# Patient Record
Sex: FEMALE | Race: WHITE | NOT HISPANIC OR LATINO | Employment: FULL TIME | URBAN - METROPOLITAN AREA
[De-identification: names, ages, dates, MRNs, and addresses within clinical notes are randomized per-mention and may not be internally consistent; named-entity substitution may affect disease eponyms.]

---

## 2018-05-22 ENCOUNTER — RECORDS - HEALTHEAST (OUTPATIENT)
Dept: LAB | Facility: CLINIC | Age: 43
End: 2018-05-22

## 2018-05-22 LAB
ALBUMIN SERPL-MCNC: 4 G/DL (ref 3.5–5)
ALP SERPL-CCNC: 69 U/L (ref 45–120)
ALT SERPL W P-5'-P-CCNC: 33 U/L (ref 0–45)
ANION GAP SERPL CALCULATED.3IONS-SCNC: 11 MMOL/L (ref 5–18)
AST SERPL W P-5'-P-CCNC: 22 U/L (ref 0–40)
BILIRUB SERPL-MCNC: 0.4 MG/DL (ref 0–1)
BUN SERPL-MCNC: 12 MG/DL (ref 8–22)
CALCIUM SERPL-MCNC: 9.5 MG/DL (ref 8.5–10.5)
CHLORIDE BLD-SCNC: 107 MMOL/L (ref 98–107)
CHOLEST SERPL-MCNC: 207 MG/DL
CO2 SERPL-SCNC: 22 MMOL/L (ref 22–31)
CREAT SERPL-MCNC: 0.85 MG/DL (ref 0.6–1.1)
FASTING STATUS PATIENT QL REPORTED: ABNORMAL
GFR SERPL CREATININE-BSD FRML MDRD: >60 ML/MIN/1.73M2
GLUCOSE BLD-MCNC: 102 MG/DL (ref 70–125)
HDLC SERPL-MCNC: 55 MG/DL
LDLC SERPL CALC-MCNC: 137 MG/DL
POTASSIUM BLD-SCNC: 3.7 MMOL/L (ref 3.5–5)
PROT SERPL-MCNC: 7.2 G/DL (ref 6–8)
SODIUM SERPL-SCNC: 140 MMOL/L (ref 136–145)
TRIGL SERPL-MCNC: 75 MG/DL
TSH SERPL DL<=0.005 MIU/L-ACNC: 1.05 UIU/ML (ref 0.3–5)

## 2018-05-23 LAB
HPV SOURCE: NORMAL
HUMAN PAPILLOMA VIRUS 16 DNA: NEGATIVE
HUMAN PAPILLOMA VIRUS 18 DNA: NEGATIVE
HUMAN PAPILLOMA VIRUS FINAL DIAGNOSIS: NORMAL
HUMAN PAPILLOMA VIRUS OTHER HR: NEGATIVE
SPECIMEN DESCRIPTION: NORMAL

## 2018-05-31 LAB
BKR LAB AP ABNORMAL BLEEDING: NO
BKR LAB AP BIRTH CONTROL/HORMONES: NORMAL
BKR LAB AP CERVICAL APPEARANCE: NORMAL
BKR LAB AP GYN ADEQUACY: NORMAL
BKR LAB AP GYN INTERPRETATION: NORMAL
BKR LAB AP HPV REFLEX: NORMAL
BKR LAB AP LMP: NORMAL
BKR LAB AP PATIENT STATUS: NORMAL
BKR LAB AP PREVIOUS ABNORMAL: NORMAL
BKR LAB AP PREVIOUS NORMAL: 2011
HIGH RISK?: NO
PATH REPORT.COMMENTS IMP SPEC: NORMAL
RESULT FLAG (HE HISTORICAL CONVERSION): NORMAL

## 2021-05-28 ENCOUNTER — RECORDS - HEALTHEAST (OUTPATIENT)
Dept: ADMINISTRATIVE | Facility: CLINIC | Age: 46
End: 2021-05-28

## 2021-08-21 ENCOUNTER — HEALTH MAINTENANCE LETTER (OUTPATIENT)
Age: 46
End: 2021-08-21

## 2021-08-25 ENCOUNTER — LAB (OUTPATIENT)
Dept: FAMILY MEDICINE | Facility: CLINIC | Age: 46
End: 2021-08-25
Attending: PHYSICIAN ASSISTANT
Payer: COMMERCIAL

## 2021-08-25 ENCOUNTER — E-VISIT (OUTPATIENT)
Dept: URGENT CARE | Facility: URGENT CARE | Age: 46
End: 2021-08-25
Payer: COMMERCIAL

## 2021-08-25 DIAGNOSIS — Z20.822 SUSPECTED COVID-19 VIRUS INFECTION: Primary | ICD-10-CM

## 2021-08-25 DIAGNOSIS — Z20.822 SUSPECTED COVID-19 VIRUS INFECTION: ICD-10-CM

## 2021-08-25 LAB — SARS-COV-2 RNA RESP QL NAA+PROBE: NEGATIVE

## 2021-08-25 PROCEDURE — 99421 OL DIG E/M SVC 5-10 MIN: CPT | Performed by: PHYSICIAN ASSISTANT

## 2021-08-25 PROCEDURE — U0005 INFEC AGEN DETEC AMPLI PROBE: HCPCS

## 2021-08-25 PROCEDURE — U0003 INFECTIOUS AGENT DETECTION BY NUCLEIC ACID (DNA OR RNA); SEVERE ACUTE RESPIRATORY SYNDROME CORONAVIRUS 2 (SARS-COV-2) (CORONAVIRUS DISEASE [COVID-19]), AMPLIFIED PROBE TECHNIQUE, MAKING USE OF HIGH THROUGHPUT TECHNOLOGIES AS DESCRIBED BY CMS-2020-01-R: HCPCS

## 2021-08-25 NOTE — PATIENT INSTRUCTIONS
Dear Roslyn Glover,    Your symptoms show that you may have coronavirus (COVID-19). This illness can cause fever, cough and trouble breathing. Many people get a mild case and get better on their own. Some people can get very sick.    Will I be tested for COVID-19?  We would like to test you for Covid-19 virus. I have placed orders for this test.     For all employees or close contacts (except [place] and [place] - see below), go to your [employer] home page and scroll down to the section that says  You have an appointment that needs to be scheduled  and click the large green button that says  Schedule Now  and follow the steps to find the next available opening.     If you are unable to complete these steps or if you cannot find any available times, please call [phone] to schedule employee testing.     [place] employees or close contacts, please call [phone].   [place] ([facility]) employees or close contacts call [phone].    Return to work/school/ guidance:  Please let your workplace manager and staffing office know when your quarantine ends     We can t give you an exact date as it depends on the above. You can calculate this on your own or work with your manager/staffing office to calculate this. (For example if you were exposed on 10/4, you would have to quarantine for 14 full days. That would be through 10/18. You could return on 10/19.)      If you receive a positive COVID-19 test result, follow the guidance of the those who are giving you the results. Usually the return to work is 10 (or in some cases 20 days from symptom onset.) If you work at [employer] [facility], you must also be cleared by Employee Occupational Health and Safety to return to work.        If you receive a negative COVID-19 test result and did not have a high risk exposure to someone with a known positive COVID-19 test, you can return to work once you're free of fever for 24 hours without fever-reducing medication  and your symptoms are improving or resolved.      If you receive a negative COVID-19 test and If you had a high risk exposure to someone who has tested positive for COVID-19 then you can return to work 14 days after your last contact with the positive individual    Note: If you have ongoing exposure to the covid positive person, this quarantine period may be more than 14 days. (For example, if you are continued to be exposed to your child who tested positive and cannot isolate from them, then the quarantine of 7-14 days can't start until your child is no longer contagious. This is typically 10 days from onset of the child's symptoms. So the total duration may be 17-24 days in this case.)    Sign up for [employer].   We know it's scary to hear that you might have COVID-19. We want to track your symptoms to make sure you're okay over the next 2 weeks. Please look for an email from [employer]--this is a free, online program that we'll use to keep in touch. To sign up, follow the link in the email you will receive. Learn more at [email or web address]    How can I take care of myself?    Get lots of rest. Drink extra fluids (unless a doctor has told you not to)    Take Tylenol (acetaminophen) or ibuprofen for fever or pain. If you have liver or kidney problems, ask your family doctor if it's okay to take Tylenol o ibuprofen    If you have other health problems (like cancer, heart failure, an organ transplant or severe kidney disease): Call your specialty clinic if you don't feel better in the next 2 days.    Know when to call 911. Emergency warning signs include:  o Trouble breathing or shortness of breath  o Pain or pressure in the chest that doesn't go away  o Feeling confused like you haven't felt before, or not being able to wake up  o Bluish-colored lips or face    Where can I get more information?   [facility] - About COVID-19:   [email or web address]    CDC - What to Do If You're  Sick:   www.cdc.gov/coronavirus/2019-ncov/about/steps-when-sick.html    August 25, 2021  RE:  Roslyn Glover                                                                                                                  [address]  [place] [address]      To whom it may concern:    I evaluated Roslyn Glover on August 25, 2021. Roslyn Glover should be excused from work/school.     They should let their workplace manager and staffing office know when their quarantine ends.    We can not give an exact date as it depends on the information below. They can calculate this on their own or work with their manager/staffing office to calculate this. (For example if they were exposed on 10/04, they would have to quarantine for 14 full days. That would be through 10/18. They could return on 10/19.)    Quarantine Guidelines:      If patient receives a positive COVID-19 test result, they should follow the guidance of those who are giving the results. Usually the return to work is 10 (or in some cases 20 days from symptom onset.) If they work at [facility], they must be cleared by Employee Occupational Health and Safety to return to work.        If patient receives a negative COVID-19 test result and did not have a high risk exposure to someone with a known positive COVID-19 test, they can return to work once they're free of fever for 24 hours without fever-reducing medication and their symptoms are improving or resolved.      If patient receives a negative COVID-19 test and if they had a high risk exposure to someone who has tested positive for COVID-19 then they can return to work 14 days after their last contact with the positive individual    Note: If there is ongoing exposure to the covid positive person, this quarantine period may be longer than 14 days. (For example, if they are continually exposed to their child, who tested positive and cannot isolate from them, then the quarantine of 7-14  days can't start until their child is no longer contagious. This is typically 10 days from onset to the child's symptoms. So the total duration may be 17-24 days in this case.)     Sincerely,  Nick Becker PA-C

## 2021-10-16 ENCOUNTER — HEALTH MAINTENANCE LETTER (OUTPATIENT)
Age: 46
End: 2021-10-16

## 2021-10-25 ENCOUNTER — E-VISIT (OUTPATIENT)
Dept: URGENT CARE | Facility: CLINIC | Age: 46
End: 2021-10-25
Payer: COMMERCIAL

## 2021-10-25 DIAGNOSIS — R09.89 RUNNY NOSE: Primary | ICD-10-CM

## 2021-10-25 PROCEDURE — 99421 OL DIG E/M SVC 5-10 MIN: CPT | Performed by: PHYSICIAN ASSISTANT

## 2021-10-25 NOTE — PATIENT INSTRUCTIONS
Dear Roslyn Glover,    Your symptoms show that you may have coronavirus (COVID-19). This illness can cause fever, cough and trouble breathing. Many people get a mild case and get better on their own. Some people can get very sick.    Will I be tested for COVID-19?  We would like to test you for Covid-19 virus. I have placed orders for this test.     For all employees or close contacts (except [place] and [place] - see below), go to your [facility] home page and scroll down to the section that says  You have an appointment that needs to be scheduled  and click the large green button that says  Schedule Now  and follow the steps to find the next available opening.     If you are unable to complete these steps or if you cannot find any available times, please call [phone] to schedule employee testing.     [place] employees or close contacts, please call [phone].   [place] ([facility]) employees or close contacts call [phone].    Return to work/school/ guidance:  Please let your workplace manager and staffing office know when your quarantine ends     We can t give you an exact date as it depends on the above. You can calculate this on your own or work with your manager/staffing office to calculate this. (For example if you were exposed on 10/4, you would have to quarantine for 14 full days. That would be through 10/18. You could return on 10/19.)      If you receive a positive COVID-19 test result, follow the guidance of the those who are giving you the results. Usually the return to work is 10 (or in some cases 20 days from symptom onset.) If you work at [employer] [facility], you must also be cleared by Employee Occupational Health and Safety to return to work.        If you receive a negative COVID-19 test result and did not have a high risk exposure to someone with a known positive COVID-19 test, you can return to work once you're free of fever for 24 hours without fever-reducing medication  and your symptoms are improving or resolved.      If you receive a negative COVID-19 test and If you had a high risk exposure to someone who has tested positive for COVID-19 then you can return to work 14 days after your last contact with the positive individual    Note: If you have ongoing exposure to the covid positive person, this quarantine period may be more than 14 days. (For example, if you are continued to be exposed to your child who tested positive and cannot isolate from them, then the quarantine of 7-14 days can't start until your child is no longer contagious. This is typically 10 days from onset of the child's symptoms. So the total duration may be 17-24 days in this case.)    Sign up for [employer].   We know it's scary to hear that you might have COVID-19. We want to track your symptoms to make sure you're okay over the next 2 weeks. Please look for an email from [employer]--this is a free, online program that we'll use to keep in touch. To sign up, follow the link in the email you will receive. Learn more at [email or web address]    How can I take care of myself?    Get lots of rest. Drink extra fluids (unless a doctor has told you not to)    Take Tylenol (acetaminophen) or ibuprofen for fever or pain. If you have liver or kidney problems, ask your family doctor if it's okay to take Tylenol o ibuprofen    If you have other health problems (like cancer, heart failure, an organ transplant or severe kidney disease): Call your specialty clinic if you don't feel better in the next 2 days.    Know when to call 911. Emergency warning signs include:  o Trouble breathing or shortness of breath  o Pain or pressure in the chest that doesn't go away  o Feeling confused like you haven't felt before, or not being able to wake up  o Bluish-colored lips or face    Where can I get more information?   [facility] - About COVID-19:   [email or web address]    CDC - What to Do If You're  Sick:   www.cdc.gov/coronavirus/2019-ncov/about/steps-when-sick.html    October 25, 2021  RE:  Roslyn Glover                                                                                                                  [address]  [place] [address]      To whom it may concern:    I evaluated Roslyn Glover on October 25, 2021. Roslyn Glover should be excused from work/school.     They should let their workplace manager and staffing office know when their quarantine ends.    We can not give an exact date as it depends on the information below. They can calculate this on their own or work with their manager/staffing office to calculate this. (For example if they were exposed on 10/04, they would have to quarantine for 14 full days. That would be through 10/18. They could return on 10/19.)    Quarantine Guidelines:      If patient receives a positive COVID-19 test result, they should follow the guidance of those who are giving the results. Usually the return to work is 10 (or in some cases 20 days from symptom onset.) If they work at [employer], they must be cleared by Employee Occupational Health and Safety to return to work.        If patient receives a negative COVID-19 test result and did not have a high risk exposure to someone with a known positive COVID-19 test, they can return to work once they're free of fever for 24 hours without fever-reducing medication and their symptoms are improving or resolved.      If patient receives a negative COVID-19 test and if they had a high risk exposure to someone who has tested positive for COVID-19 then they can return to work 14 days after their last contact with the positive individual    Note: If there is ongoing exposure to the covid positive person, this quarantine period may be longer than 14 days. (For example, if they are continually exposed to their child, who tested positive and cannot isolate from them, then the quarantine of  7-14 days can't start until their child is no longer contagious. This is typically 10 days from onset to the child's symptoms. So the total duration may be 17-24 days in this case.)     Sincerely,  Karo Reyes PA-C

## 2021-10-26 ENCOUNTER — LAB (OUTPATIENT)
Dept: FAMILY MEDICINE | Facility: CLINIC | Age: 46
End: 2021-10-26
Attending: PHYSICIAN ASSISTANT
Payer: COMMERCIAL

## 2021-10-26 DIAGNOSIS — R09.89 RUNNY NOSE: ICD-10-CM

## 2021-10-26 LAB — SARS-COV-2 RNA RESP QL NAA+PROBE: NEGATIVE

## 2021-10-26 PROCEDURE — U0003 INFECTIOUS AGENT DETECTION BY NUCLEIC ACID (DNA OR RNA); SEVERE ACUTE RESPIRATORY SYNDROME CORONAVIRUS 2 (SARS-COV-2) (CORONAVIRUS DISEASE [COVID-19]), AMPLIFIED PROBE TECHNIQUE, MAKING USE OF HIGH THROUGHPUT TECHNOLOGIES AS DESCRIBED BY CMS-2020-01-R: HCPCS

## 2021-10-26 PROCEDURE — U0005 INFEC AGEN DETEC AMPLI PROBE: HCPCS

## 2021-11-18 ENCOUNTER — E-VISIT (OUTPATIENT)
Dept: URGENT CARE | Facility: CLINIC | Age: 46
End: 2021-11-18
Payer: COMMERCIAL

## 2021-11-18 DIAGNOSIS — Z20.822 CLOSE EXPOSURE TO 2019 NOVEL CORONAVIRUS: Primary | ICD-10-CM

## 2021-11-18 PROCEDURE — 99421 OL DIG E/M SVC 5-10 MIN: CPT | Performed by: EMERGENCY MEDICINE

## 2021-11-18 NOTE — PATIENT INSTRUCTIONS
"  Dear Roslyn Glover,    Based on your exposure to COVID-19 (coronavirus), we would like to test you for this virus. I have placed an order for this test. The best time for testing is 5-7 days after the exposure.    How to schedule:  For all employees or close contacts (except [place] and [place] - see below), go to your [employer] home page and scroll down to the section that says  You have an appointment that needs to be scheduled  and click the large green button that says  Schedule Now  and follow the steps to find the next available opening.     If you are unable to complete these steps or if you cannot find any available times, please call [phone] to schedule employee testing.     [place] employees or close contacts, please call [phone].   [place] ([facility]) employees or close contacts call [phone].    Return to work/school/ guidance:   For people with high risk exposures outside the home    Please let your workplace manager and staffing office know when your quarantine ends.     We can not give you an exact date as it depends on the information below. You can calculate this on your own or work with your manager/staffing office to calculate this. (For example if you were exposed on 10/4, you would have to quarantine for 14 full days. That would be through 10/18. You could return on 10/19.)    Quarantine Guidelines:  Patients (\"contacts\") who have been in close prolonged contact of an infected person(s) (within six feet for at least 15 minutes within a 24 hour period), and remain asymptomatic should enter quarantine based on the following options:    14-day quarantine period (this remains the CDC recommendation for the greatest protection against spread of COVID-19) OR    Minimum 7-day quarantine with negative RT-PCR test collected on day 5 or later OR    10-day quarantine with no test  Quarantine Guideline exceptions are as follows:    People who have been fully vaccinated do not " need to quarantine if the exposure was at least 2 weeks after the last vaccination. This includes vaccinated health care workers.    Not fully vaccinated and unvaccinated Individuals who work in health care, congregate care, or congregate living should be off work for 14 days from their last date of exposure. Community activities for this group can be resumed based on options above. Fully vaccinated individuals in this group do not need to quarantine from work after exposure.    Not fully vaccinated and unvaccinated people whose high-risk exposure was a household member should always quarantine for 14 days from their last date of exposure. Fully vaccinated people in this category do not need to quarantine.    Not fully vaccinated or unvaccinated residents of congregate care and congregate living settings should always quarantine for 14 days from their last date of exposure. Fully vaccinated residents do not need to quarantine.    Note: If you have ongoing exposure to the covid positive person, this quarantine period may be more than 14 days. (For example, if you are continued to be exposed to your child who tested positive and cannot isolate from them, then the quarantine of 7-14 days can't start until your child is no longer contagious. This is typically 10 days from onset of the child's symptoms. So the total duration may be 17-24 days in this case.)    You should continue symptom monitoring until day 14 post-exposure. If you develop signs or symptoms of COVID-19, isolate and get tested (even if you have been tested already).    How to quarantine:   Stay home and away from others. Don't go to school or anywhere else. Generally quarantine means staying home from work but there are some exceptions to this. Please contact your workplace.  No hugging, kissing or shaking hands.  Don't let anyone visit.  Cover your mouth and nose with a mask, tissue or washcloth to avoid spreading germs.  Wash your hands and face often.  Use soap and water.    What are the symptoms of COVID-19?  The most common symptoms are cough, fever and trouble breathing. Less common symptoms include headache, body aches, fatigue (feeling very tired), chills, sore throat, stuffy or runny nose, diarrhea (loose poop), loss of taste or smell, belly pain, and nausea or vomiting (feeling sick to your stomach or throwing up).  After 14 days, if you have still don't have symptoms, you likely don't have this virus.  If you develop symptoms, follow these guidelines.  If you're normally healthy: Please start another eVisit.  If you have a serious health problem (like cancer, heart failure, an organ transplant or kidney disease): Call your specialty clinic. Let them know that you might have COVID-19.    Where can I get more information?   [facility] - About COVID-19: [email or web address]  CDC - What to Do If You're Sick: www.cdc.gov/coronavirus/2019-ncov/about/steps-when-sick.html  CDC - Ending Home Isolation: www.cdc.gov/coronavirus/2019-ncov/hcp/disposition-in-home-patients.html  CDC - Caring for Someone: www.cdc.gov/coronavirus/2019-ncov/if-you-are-sick/care-for-someone.html  [facility] clinical trials (COVID-19 research studies): clinicalaffairs.[facility]-clinical-trials  Below are the COVID-19 hotlines at the [facility] ([facility]). Interpreters are available.  For health questions: Call 923-040-3025 or 1-122.607.1021 (7 a.m. to 7 p.m.)  For questions about schools and childcare: Call 517-931-7809 or 1-529.709.1748 (7 a.m. to 7 p.m.)      November 18, 2021  RE:  Roslyn Glover                                                                                                                   [address] [address]      To whom it may concern:    I evaluated Roslyn Glover on November 18, 2021. Roslyn Glover should be excused from work/school.    They should let their workplace manager and  "staffing office know when their quarantine ends.    We can not give an exact date as it depends on the information below. They can calculate this on their own or work with their manager/staffing office to calculate this. (For example if they were exposed on 10/04, they would have to quarantine for 14 full days. That would be through 10/18. They could return on 10/19.)    Quarantine Guidelines:    Patients (\"contacts\") who have been in close prolonged contact of an infected person(s) (within six feet for at least 15 minutes within a 24 hour period) and remain asymptomatic should enter quarantine based on the following options:      14-day quarantine period (this remains the CDC recommendation for the greatest protection against spread of COVID-19) OR    Minimum 7-day quarantine with negative RT-PCR test collected on day 5 or later OR    10-day quarantine with no test   Quarantine Guideline exceptions are as follows:    People who have been fully vaccinated do not need to quarantine if the exposure was at least 2 weeks after the last vaccination. This includes vaccinated health care workers.    Not fully vaccinated and unvaccinated Individuals who work in health care, congregate care, or congregate living should be off work for 14 days from their last date of exposure. Community activities for this group can be resumed based on options above. Fully vaccinated individuals in this group do not need to quarantine from work after exposure.    Not fully vaccinated and unvaccinated people whose high-risk exposure was a household member should always quarantine for 14 days from their last date of exposure. Fully vaccinated people in this category do not need to quarantine.    Not fully vaccinated or unvaccinated residents of congregate care and congregate living settings should always quarantine for 14 days from their last date of exposure. Fully vaccinated residents do not need to quarantine.    Note: If there is ongoing " exposure to the covid positive person, this quarantine period may be longer than 14 days. (For example, if they are continually exposed to their child, who tested positive and cannot isolate from them, then the quarantine of 7-14 days can't start until their child is no longer contagious. This is typically 10 days from onset to the child's symptoms. So the total duration may be 17-24 days in this case.)    Roslyn Glover should continue symptom monitoring until day 14 post-exposure. If they develop signs or symptoms of COVID-19, they should isolate and get tested (even if they have been tested already).    Sincerely,  Milan Ulloa MD

## 2021-11-20 ENCOUNTER — LAB (OUTPATIENT)
Dept: FAMILY MEDICINE | Facility: CLINIC | Age: 46
End: 2021-11-20
Attending: EMERGENCY MEDICINE
Payer: COMMERCIAL

## 2021-11-20 DIAGNOSIS — Z20.822 CLOSE EXPOSURE TO 2019 NOVEL CORONAVIRUS: ICD-10-CM

## 2021-11-20 LAB — SARS-COV-2 RNA RESP QL NAA+PROBE: POSITIVE

## 2021-11-20 PROCEDURE — U0005 INFEC AGEN DETEC AMPLI PROBE: HCPCS

## 2021-11-20 PROCEDURE — U0003 INFECTIOUS AGENT DETECTION BY NUCLEIC ACID (DNA OR RNA); SEVERE ACUTE RESPIRATORY SYNDROME CORONAVIRUS 2 (SARS-COV-2) (CORONAVIRUS DISEASE [COVID-19]), AMPLIFIED PROBE TECHNIQUE, MAKING USE OF HIGH THROUGHPUT TECHNOLOGIES AS DESCRIBED BY CMS-2020-01-R: HCPCS

## 2022-05-18 PROCEDURE — U0003 INFECTIOUS AGENT DETECTION BY NUCLEIC ACID (DNA OR RNA); SEVERE ACUTE RESPIRATORY SYNDROME CORONAVIRUS 2 (SARS-COV-2) (CORONAVIRUS DISEASE [COVID-19]), AMPLIFIED PROBE TECHNIQUE, MAKING USE OF HIGH THROUGHPUT TECHNOLOGIES AS DESCRIBED BY CMS-2020-01-R: HCPCS | Performed by: INTERNAL MEDICINE

## 2022-05-19 ENCOUNTER — LAB REQUISITION (OUTPATIENT)
Dept: LAB | Facility: HOSPITAL | Age: 47
End: 2022-05-19

## 2022-05-19 LAB — SARS-COV-2 RNA RESP QL NAA+PROBE: NEGATIVE

## 2022-06-10 ENCOUNTER — LAB REQUISITION (OUTPATIENT)
Dept: LAB | Facility: HOSPITAL | Age: 47
End: 2022-06-10

## 2022-06-10 LAB
VZV IGG SER QL IA: 1576 INDEX
VZV IGG SER QL IA: POSITIVE

## 2022-06-10 PROCEDURE — 86787 VARICELLA-ZOSTER ANTIBODY: CPT | Performed by: INTERNAL MEDICINE

## 2022-06-10 PROCEDURE — 36415 COLL VENOUS BLD VENIPUNCTURE: CPT | Performed by: INTERNAL MEDICINE

## 2022-09-25 ENCOUNTER — HEALTH MAINTENANCE LETTER (OUTPATIENT)
Age: 47
End: 2022-09-25

## 2023-06-08 ENCOUNTER — LAB REQUISITION (OUTPATIENT)
Dept: LAB | Facility: CLINIC | Age: 48
End: 2023-06-08
Payer: COMMERCIAL

## 2023-06-08 DIAGNOSIS — Z13.220 ENCOUNTER FOR SCREENING FOR LIPOID DISORDERS: ICD-10-CM

## 2023-06-08 DIAGNOSIS — Z01.419 ENCOUNTER FOR GYNECOLOGICAL EXAMINATION (GENERAL) (ROUTINE) WITHOUT ABNORMAL FINDINGS: ICD-10-CM

## 2023-06-08 DIAGNOSIS — Z13.1 ENCOUNTER FOR SCREENING FOR DIABETES MELLITUS: ICD-10-CM

## 2023-06-08 PROCEDURE — 80061 LIPID PANEL: CPT | Mod: ORL | Performed by: PHYSICIAN ASSISTANT

## 2023-06-08 PROCEDURE — G0145 SCR C/V CYTO,THINLAYER,RESCR: HCPCS | Mod: ORL | Performed by: PHYSICIAN ASSISTANT

## 2023-06-08 PROCEDURE — 80048 BASIC METABOLIC PNL TOTAL CA: CPT | Mod: ORL | Performed by: PHYSICIAN ASSISTANT

## 2023-06-08 PROCEDURE — 87624 HPV HI-RISK TYP POOLED RSLT: CPT | Mod: ORL | Performed by: PHYSICIAN ASSISTANT

## 2023-06-09 LAB
ANION GAP SERPL CALCULATED.3IONS-SCNC: 15 MMOL/L (ref 7–15)
BUN SERPL-MCNC: 10 MG/DL (ref 6–20)
CALCIUM SERPL-MCNC: 10 MG/DL (ref 8.6–10)
CHLORIDE SERPL-SCNC: 101 MMOL/L (ref 98–107)
CHOLEST SERPL-MCNC: 217 MG/DL
CREAT SERPL-MCNC: 0.89 MG/DL (ref 0.51–0.95)
DEPRECATED HCO3 PLAS-SCNC: 24 MMOL/L (ref 22–29)
GFR SERPL CREATININE-BSD FRML MDRD: 79 ML/MIN/1.73M2
GLUCOSE SERPL-MCNC: 85 MG/DL (ref 70–99)
HDLC SERPL-MCNC: 70 MG/DL
LDLC SERPL CALC-MCNC: 123 MG/DL
NONHDLC SERPL-MCNC: 147 MG/DL
POTASSIUM SERPL-SCNC: 3.9 MMOL/L (ref 3.4–5.3)
SODIUM SERPL-SCNC: 140 MMOL/L (ref 136–145)
TRIGL SERPL-MCNC: 122 MG/DL

## 2023-06-12 LAB
BKR LAB AP GYN ADEQUACY: NORMAL
BKR LAB AP GYN INTERPRETATION: NORMAL
BKR LAB AP HPV REFLEX: NORMAL
BKR LAB AP LMP: NORMAL
BKR LAB AP PREVIOUS ABNORMAL: NORMAL
PATH REPORT.COMMENTS IMP SPEC: NORMAL
PATH REPORT.COMMENTS IMP SPEC: NORMAL
PATH REPORT.RELEVANT HX SPEC: NORMAL

## 2023-06-13 LAB
HUMAN PAPILLOMA VIRUS 16 DNA: NEGATIVE
HUMAN PAPILLOMA VIRUS 18 DNA: NEGATIVE
HUMAN PAPILLOMA VIRUS FINAL DIAGNOSIS: NORMAL
HUMAN PAPILLOMA VIRUS OTHER HR: NEGATIVE

## 2023-07-11 ENCOUNTER — HOSPITAL ENCOUNTER (OUTPATIENT)
Dept: MAMMOGRAPHY | Facility: CLINIC | Age: 48
Discharge: HOME | End: 2023-07-11
Attending: PHYSICIAN ASSISTANT | Admitting: PHYSICIAN ASSISTANT
Payer: COMMERCIAL

## 2023-07-11 DIAGNOSIS — Z12.31 VISIT FOR SCREENING MAMMOGRAM: ICD-10-CM

## 2023-07-11 PROCEDURE — 77067 SCR MAMMO BI INCL CAD: CPT

## 2023-10-14 ENCOUNTER — HEALTH MAINTENANCE LETTER (OUTPATIENT)
Age: 48
End: 2023-10-14

## 2024-05-16 ENCOUNTER — LAB REQUISITION (OUTPATIENT)
Dept: LAB | Facility: CLINIC | Age: 49
End: 2024-05-16
Payer: COMMERCIAL

## 2024-05-16 DIAGNOSIS — R05.1 ACUTE COUGH: ICD-10-CM

## 2024-05-16 DIAGNOSIS — J02.9 ACUTE PHARYNGITIS, UNSPECIFIED: ICD-10-CM

## 2024-05-16 PROCEDURE — 87637 SARSCOV2&INF A&B&RSV AMP PRB: CPT | Mod: ORL | Performed by: FAMILY MEDICINE

## 2024-05-16 PROCEDURE — 87081 CULTURE SCREEN ONLY: CPT | Mod: ORL | Performed by: FAMILY MEDICINE

## 2024-05-17 LAB
FLUAV RNA SPEC QL NAA+PROBE: NEGATIVE
FLUBV RNA RESP QL NAA+PROBE: NEGATIVE
RSV RNA SPEC NAA+PROBE: NEGATIVE
SARS-COV-2 RNA RESP QL NAA+PROBE: NEGATIVE

## 2024-05-19 LAB — BACTERIA SPEC CULT: NORMAL

## 2024-06-27 ENCOUNTER — LAB REQUISITION (OUTPATIENT)
Dept: LAB | Facility: CLINIC | Age: 49
End: 2024-06-27
Payer: COMMERCIAL

## 2024-06-27 ENCOUNTER — TRANSFERRED RECORDS (OUTPATIENT)
Dept: HEALTH INFORMATION MANAGEMENT | Facility: CLINIC | Age: 49
End: 2024-06-27

## 2024-06-27 DIAGNOSIS — I10 ESSENTIAL (PRIMARY) HYPERTENSION: ICD-10-CM

## 2024-06-27 DIAGNOSIS — E55.9 VITAMIN D DEFICIENCY, UNSPECIFIED: ICD-10-CM

## 2024-06-27 DIAGNOSIS — E78.2 MIXED HYPERLIPIDEMIA: ICD-10-CM

## 2024-06-27 LAB
ALBUMIN SERPL BCG-MCNC: 4.4 G/DL (ref 3.5–5.2)
ALP SERPL-CCNC: 69 U/L (ref 40–150)
ALT SERPL W P-5'-P-CCNC: 23 U/L (ref 0–50)
ANION GAP SERPL CALCULATED.3IONS-SCNC: 14 MMOL/L (ref 7–15)
AST SERPL W P-5'-P-CCNC: 23 U/L (ref 0–45)
BILIRUB SERPL-MCNC: 0.3 MG/DL
BUN SERPL-MCNC: 12.6 MG/DL (ref 6–20)
CALCIUM SERPL-MCNC: 9.3 MG/DL (ref 8.6–10)
CHLORIDE SERPL-SCNC: 101 MMOL/L (ref 98–107)
CHOLEST SERPL-MCNC: 214 MG/DL
CREAT SERPL-MCNC: 0.83 MG/DL (ref 0.51–0.95)
DEPRECATED HCO3 PLAS-SCNC: 24 MMOL/L (ref 22–29)
EGFRCR SERPLBLD CKD-EPI 2021: 85 ML/MIN/1.73M2
ERYTHROCYTE [DISTWIDTH] IN BLOOD BY AUTOMATED COUNT: 13.2 % (ref 10–15)
FASTING STATUS PATIENT QL REPORTED: ABNORMAL
FASTING STATUS PATIENT QL REPORTED: NORMAL
GLUCOSE SERPL-MCNC: 96 MG/DL (ref 70–99)
HCT VFR BLD AUTO: 44.4 % (ref 35–47)
HDLC SERPL-MCNC: 70 MG/DL
HGB BLD-MCNC: 14.5 G/DL (ref 11.7–15.7)
LDLC SERPL CALC-MCNC: 116 MG/DL
MCH RBC QN AUTO: 28.2 PG (ref 26.5–33)
MCHC RBC AUTO-ENTMCNC: 32.7 G/DL (ref 31.5–36.5)
MCV RBC AUTO: 86 FL (ref 78–100)
NONHDLC SERPL-MCNC: 144 MG/DL
PLATELET # BLD AUTO: 161 10E3/UL (ref 150–450)
POTASSIUM SERPL-SCNC: 3.7 MMOL/L (ref 3.4–5.3)
PROT SERPL-MCNC: 7.4 G/DL (ref 6.4–8.3)
RBC # BLD AUTO: 5.14 10E6/UL (ref 3.8–5.2)
SODIUM SERPL-SCNC: 139 MMOL/L (ref 135–145)
TRIGL SERPL-MCNC: 139 MG/DL
TSH SERPL DL<=0.005 MIU/L-ACNC: 1.45 UIU/ML (ref 0.3–4.2)
VIT D+METAB SERPL-MCNC: 45 NG/ML (ref 20–50)
WBC # BLD AUTO: 8.6 10E3/UL (ref 4–11)

## 2024-06-27 PROCEDURE — 80061 LIPID PANEL: CPT | Mod: ORL | Performed by: PHYSICIAN ASSISTANT

## 2024-06-27 PROCEDURE — 82306 VITAMIN D 25 HYDROXY: CPT | Mod: ORL | Performed by: PHYSICIAN ASSISTANT

## 2024-06-27 PROCEDURE — 80053 COMPREHEN METABOLIC PANEL: CPT | Mod: ORL | Performed by: PHYSICIAN ASSISTANT

## 2024-06-27 PROCEDURE — 85027 COMPLETE CBC AUTOMATED: CPT | Mod: ORL | Performed by: PHYSICIAN ASSISTANT

## 2024-06-27 PROCEDURE — 84443 ASSAY THYROID STIM HORMONE: CPT | Mod: ORL | Performed by: PHYSICIAN ASSISTANT

## 2024-07-02 ENCOUNTER — MEDICAL CORRESPONDENCE (OUTPATIENT)
Dept: HEALTH INFORMATION MANAGEMENT | Facility: CLINIC | Age: 49
End: 2024-07-02
Payer: COMMERCIAL

## 2024-07-03 ENCOUNTER — TRANSCRIBE ORDERS (OUTPATIENT)
Dept: OTHER | Age: 49
End: 2024-07-03

## 2024-07-03 DIAGNOSIS — J32.9 RECURRENT SINUSITIS: Primary | ICD-10-CM

## 2024-07-05 ENCOUNTER — TRANSCRIBE ORDERS (OUTPATIENT)
Dept: OTHER | Age: 49
End: 2024-07-05

## 2024-07-05 DIAGNOSIS — Z12.11 SCREENING FOR COLON CANCER: Primary | ICD-10-CM

## 2024-07-31 ENCOUNTER — HOSPITAL ENCOUNTER (OUTPATIENT)
Dept: MAMMOGRAPHY | Facility: CLINIC | Age: 49
Discharge: HOME | End: 2024-07-31
Attending: PHYSICIAN ASSISTANT | Admitting: PHYSICIAN ASSISTANT
Payer: COMMERCIAL

## 2024-07-31 DIAGNOSIS — Z12.31 BREAST CANCER SCREENING BY MAMMOGRAM: ICD-10-CM

## 2024-07-31 PROCEDURE — 77063 BREAST TOMOSYNTHESIS BI: CPT

## 2024-08-05 NOTE — PROGRESS NOTES
Assessment & Plan     Benign exam, Add astelin, nasal saline, CT    Problem List Items Addressed This Visit    None  Visit Diagnoses       Recurrent sinusitis    -  Primary    Relevant Medications    albuterol (PROAIR HFA/PROVENTIL HFA/VENTOLIN HFA) 108 (90 Base) MCG/ACT inhaler    fexofenadine (ALLEGRA ALLERGY) 180 MG tablet    fluticasone (FLONASE) 50 MCG/ACT nasal spray    azelastine (ASTELIN) 0.1 % nasal spray    Other Relevant Orders    CT Sinus w/o Contrast    Adult Allergy/Asthma  Referral    PND (post-nasal drip)        Relevant Medications    albuterol (PROAIR HFA/PROVENTIL HFA/VENTOLIN HFA) 108 (90 Base) MCG/ACT inhaler    fexofenadine (ALLEGRA ALLERGY) 180 MG tablet    fluticasone (FLONASE) 50 MCG/ACT nasal spray    azelastine (ASTELIN) 0.1 % nasal spray    Seasonal allergic rhinitis, unspecified trigger        Relevant Medications    albuterol (PROAIR HFA/PROVENTIL HFA/VENTOLIN HFA) 108 (90 Base) MCG/ACT inhaler    fexofenadine (ALLEGRA ALLERGY) 180 MG tablet    fluticasone (FLONASE) 50 MCG/ACT nasal spray    azelastine (ASTELIN) 0.1 % nasal spray    Deviated nasal septum                    26 minutes spent on the date of the encounter doing chart review, history and exam, documentation and further activities per the note  {     ROBERT Diaz  [facility]    Subjective     HPI     Referred by PRIMARY CARE PROVIDER for recurrent sinusitis- has covid last sept and has been treated nearly monthly for sinusistis since then.  No symptoms since June.  flonase  for 2 months and allegra have been helpful.  Has been prone to sinus infections most of her life,  symptoms will get worse in the spring.  No pets, dander makes things worse.  PND pretty much every day, not much rhinitis.      No GERD    Sino-Nasal Outcome Test (SNOT - 22)    1. Need to Blow Nose: (P) Very mild  2. Nasal Blockage: (P) Mild or slight  3. Sneezing: (P) Very mild  4. Runny Nose: (P) None  5.  Cough: (P) Very mild  6. Post-nasal discharge: (P) Moderate  7. Thick nasal discharge: (P) Very mild  8. Ear fullness: (P) Mild or slight  9. Dizziness: (P) None  10. Ear Pain: (P) None  11. Facial pain/pressure: (P) Very mild  12. Decreased Sense of Smell/Taste: (P) None  13. Difficulty falling asleep: (P) None  14. Wake up at night: (P) None  15. Lack of a good night's sleep: (P) None  16. Wake up tired: (P) Very mild  17. Fatigue: (P) None  18. Reduced Productivity: (P) None  19. Reduced Concentration: (P) None  20. Frustrated/restless/irritable: (P) None  21. Sad: (P) None  22. Embarrassed: (P) None    Total Score: (P) 13    COPYRIGHT 1996. [facility] [place] [facility],[place]      Review of Systems   ENT as above      Objective    There were no vitals taken for this visit.    Physical Exam     Constitutional:   The patient was in no acute distress.      Head/Face:   Normocephalic and atraumatic.  No lesions or scars.     Ears:  The tympanic membranes are normal in appearance, bony landmarks are intact.  No retraction, perforation, or masses.   No fluid or purulence was seen in the external canal or the middle ear. No evidence of infection of the middle ear or external canal, cerumen was normal in appearance.    Nose:  Anterior rhinoscopy revealed left deviated septum and absence of purulence or polyps.  1+ turbs on right, 2+ on left    Mouth:  Normal tongue, floor of mouth, buccal mucosa, and palate.  No lesions, ulceration or  masses on inspection, normal voice quality      Oropharynx:  Normal mucosa, palate symmetric with normal elevation. Tonsils  0      Neck:  Supple with normal laryngeal and tracheal landmarks. No palpable thyroid.     Lymphatic:  There is no palpable lymphadenopathy in the neck.

## 2024-08-06 ENCOUNTER — ANCILLARY PROCEDURE (OUTPATIENT)
Dept: MAMMOGRAPHY | Facility: CLINIC | Age: 49
End: 2024-08-06
Attending: PHYSICIAN ASSISTANT
Payer: COMMERCIAL

## 2024-08-06 DIAGNOSIS — N64.89 BREAST ASYMMETRY: ICD-10-CM

## 2024-08-06 PROCEDURE — 77065 DX MAMMO INCL CAD UNI: CPT | Mod: RT

## 2024-08-13 ENCOUNTER — OFFICE VISIT (OUTPATIENT)
Dept: OTOLARYNGOLOGY | Facility: CLINIC | Age: 49
End: 2024-08-13
Payer: COMMERCIAL

## 2024-08-13 DIAGNOSIS — R09.82 PND (POST-NASAL DRIP): ICD-10-CM

## 2024-08-13 DIAGNOSIS — J32.9 RECURRENT SINUSITIS: Primary | ICD-10-CM

## 2024-08-13 DIAGNOSIS — J34.2 DEVIATED NASAL SEPTUM: ICD-10-CM

## 2024-08-13 DIAGNOSIS — J30.2 SEASONAL ALLERGIC RHINITIS, UNSPECIFIED TRIGGER: ICD-10-CM

## 2024-08-13 PROBLEM — E55.9 VITAMIN D DEFICIENCY: Status: ACTIVE | Noted: 2024-08-13

## 2024-08-13 PROBLEM — I10 ESSENTIAL HYPERTENSION: Status: ACTIVE | Noted: 2024-08-13

## 2024-08-13 PROBLEM — E78.2 MIXED HYPERLIPIDEMIA: Status: ACTIVE | Noted: 2024-08-13

## 2024-08-13 PROCEDURE — 99204 OFFICE O/P NEW MOD 45 MIN: CPT | Performed by: PHYSICIAN ASSISTANT

## 2024-08-13 RX ORDER — ALBUTEROL SULFATE 90 UG/1
AEROSOL, METERED RESPIRATORY (INHALATION)
COMMUNITY
Start: 2023-10-07

## 2024-08-13 RX ORDER — CHOLECALCIFEROL (VITAMIN D3) 50 MCG
TABLET ORAL
COMMUNITY

## 2024-08-13 RX ORDER — MULTIVITAMIN
TABLET ORAL
COMMUNITY

## 2024-08-13 RX ORDER — FLUTICASONE PROPIONATE 50 MCG
SPRAY, SUSPENSION (ML) NASAL
COMMUNITY
Start: 2023-10-07

## 2024-08-13 RX ORDER — AZELASTINE 1 MG/ML
1 SPRAY, METERED NASAL AT BEDTIME
Qty: 30 ML | Refills: 11 | Status: SHIPPED | OUTPATIENT
Start: 2024-08-13

## 2024-08-13 RX ORDER — LOSARTAN POTASSIUM 50 MG/1
TABLET ORAL
COMMUNITY
Start: 2024-06-27

## 2024-08-13 RX ORDER — BACILLUS COAGULANS/INULIN 1B-250 MG
CAPSULE ORAL
COMMUNITY

## 2024-08-13 RX ORDER — FEXOFENADINE HCL 180 MG/1
TABLET ORAL
COMMUNITY

## 2024-08-13 NOTE — LETTER
8/13/2024      Roslyn Glover  [address] [address]      Dear Colleague,    Thank you for referring your patient, Roslyn Glover, to the [facility]. Please see a copy of my visit note below.    Assessment & Plan    Benign exam, Add astelin, nasal saline, CT    Problem List Items Addressed This Visit    None  Visit Diagnoses       Recurrent sinusitis    -  Primary    Relevant Medications    albuterol (PROAIR HFA/PROVENTIL HFA/VENTOLIN HFA) 108 (90 Base) MCG/ACT inhaler    fexofenadine (ALLEGRA ALLERGY) 180 MG tablet    fluticasone (FLONASE) 50 MCG/ACT nasal spray    azelastine (ASTELIN) 0.1 % nasal spray    Other Relevant Orders    CT Sinus w/o Contrast    Adult Allergy/Asthma  Referral    PND (post-nasal drip)        Relevant Medications    albuterol (PROAIR HFA/PROVENTIL HFA/VENTOLIN HFA) 108 (90 Base) MCG/ACT inhaler    fexofenadine (ALLEGRA ALLERGY) 180 MG tablet    fluticasone (FLONASE) 50 MCG/ACT nasal spray    azelastine (ASTELIN) 0.1 % nasal spray    Seasonal allergic rhinitis, unspecified trigger        Relevant Medications    albuterol (PROAIR HFA/PROVENTIL HFA/VENTOLIN HFA) 108 (90 Base) MCG/ACT inhaler    fexofenadine (ALLEGRA ALLERGY) 180 MG tablet    fluticasone (FLONASE) 50 MCG/ACT nasal spray    azelastine (ASTELIN) 0.1 % nasal spray    Deviated nasal septum                    26 minutes spent on the date of the encounter doing chart review, history and exam, documentation and further activities per the note  {     ROBERT Diaz  [facility]    Subjective     HPI     Referred by PRIMARY CARE PROVIDER for recurrent sinusitis- has covid last sept and has been treated nearly monthly for sinusistis since then.  No symptoms since June.  flonase  for 2 months and allegra have been helpful.  Has been prone to sinus infections most of her life,  symptoms will get worse in the spring.  No pets, dander makes things  worse.  PND pretty much every day, not much rhinitis.      No GERD    Sino-Nasal Outcome Test (SNOT - 22)    1. Need to Blow Nose: (P) Very mild  2. Nasal Blockage: (P) Mild or slight  3. Sneezing: (P) Very mild  4. Runny Nose: (P) None  5. Cough: (P) Very mild  6. Post-nasal discharge: (P) Moderate  7. Thick nasal discharge: (P) Very mild  8. Ear fullness: (P) Mild or slight  9. Dizziness: (P) None  10. Ear Pain: (P) None  11. Facial pain/pressure: (P) Very mild  12. Decreased Sense of Smell/Taste: (P) None  13. Difficulty falling asleep: (P) None  14. Wake up at night: (P) None  15. Lack of a good night's sleep: (P) None  16. Wake up tired: (P) Very mild  17. Fatigue: (P) None  18. Reduced Productivity: (P) None  19. Reduced Concentration: (P) None  20. Frustrated/restless/irritable: (P) None  21. Sad: (P) None  22. Embarrassed: (P) None    Total Score: (P) 13    COPYRIGHT 1996. [facility] [place]      Review of Systems   ENT as above      Objective    There were no vitals taken for this visit.    Physical Exam     Constitutional:   The patient was in no acute distress.      Head/Face:   Normocephalic and atraumatic.  No lesions or scars.     Ears:  The tympanic membranes are normal in appearance, bony landmarks are intact.  No retraction, perforation, or masses.   No fluid or purulence was seen in the external canal or the middle ear. No evidence of infection of the middle ear or external canal, cerumen was normal in appearance.    Nose:  Anterior rhinoscopy revealed left deviated septum and absence of purulence or polyps.  1+ turbs on right, 2+ on left    Mouth:  Normal tongue, floor of mouth, buccal mucosa, and palate.  No lesions, ulceration or  masses on inspection, normal voice quality      Oropharynx:  Normal mucosa, palate symmetric with normal elevation. Tonsils  0      Neck:  Supple with normal laryngeal and tracheal landmarks. No palpable thyroid.     Lymphatic:  There is no  palpable lymphadenopathy in the neck.                    Again, thank you for allowing me to participate in the care of your patient.        Sincerely,        ROBERT Diaz

## 2024-08-13 NOTE — NURSING NOTE
Sino-Nasal Outcome Test (SNOT - 22)    1. Need to Blow Nose: (P) Very mild  2. Nasal Blockage: (P) Mild or slight  3. Sneezing: (P) Very mild  4. Runny Nose: (P) None  5. Cough: (P) Very mild  6. Post-nasal discharge: (P) Moderate  7. Thick nasal discharge: (P) Very mild  8. Ear fullness: (P) Mild or slight  9. Dizziness: (P) None  10. Ear Pain: (P) None  11. Facial pain/pressure: (P) Very mild  12. Decreased Sense of Smell/Taste: (P) None  13. Difficulty falling asleep: (P) None  14. Wake up at night: (P) None  15. Lack of a good night's sleep: (P) None  16. Wake up tired: (P) Very mild  17. Fatigue: (P) None  18. Reduced Productivity: (P) None  19. Reduced Concentration: (P) None  20. Frustrated/restless/irritable: (P) None  21. Sad: (P) None  22. Embarrassed: (P) None    Total Score: (P) 13    COPYRIGHT 1996. [facility] [place]

## 2024-08-13 NOTE — PATIENT INSTRUCTIONS
Try an over the counter nasal saline mister bottle at bedtime.       Chronic Sinusitis: Care Instructions  Overview     Sinusitis is an inflammation of the mucous membranes inside the nose and sinuses. Sinuses are the hollow spaces in your skull around the eyes and nose. Sinusitis can cause pain and pressure in your head and face along with a stuffy or blocked nose. It can also cause thick, discolored mucus that drains from the nose or down the back of the throat. If these symptoms last 12 weeks or longer, you may have chronic sinusitis.  Chronic sinusitis is caused by long-term swelling of the sinuses and nasal passages. Other things, such as allergies and nasal polyps, may also be involved. A deviated nasal septum can also make it worse.  When the mucous membranes that line the sinuses get inflamed, they swell and make more mucus. The swelling can block the normal drainage of fluid from the sinuses into the nose and throat. If the fluid and mucus can't drain, they build up over time. This can make future sinus infections more likely. Chronic sinusitis can be hard to treat.  You will likely need a steroid nasal spray. Nasal washes are an important part of your treatment too. Antibiotics may be used if there's a bacterial infection. Other medicines may be needed. Surgery may be recommended if your symptoms don't get better after treatment.  Follow-up care is a key part of your treatment and safety. Be sure to make and go to all appointments, and call your doctor if you are having problems. It's also a good idea to know your test results and keep a list of the medicines you take.  How can you care for yourself at home?  Medicines    Your doctor will likely recommend a steroid nasal spray. Sometimes steroids are used as a wash, drops, or pills. Take this and other medicines exactly as prescribed.     If needed, ask your doctor if you can take an over-the-counter pain medicine, such as acetaminophen (Tylenol),  ibuprofen (Advil, Motrin), or naproxen (Aleve). Be safe with medicines. Read and follow all instructions on the label.     If your doctor prescribed antibiotics, take them as directed. Do not stop taking them just because you feel better. You need to take the full course of antibiotics.   At home    Use saline (saltwater) nasal washes every day. This helps keep your nasal passages open. It also can wash out mucus and allergens.  You can buy saline nasal washes at a grocery store or drugstore. Follow the instructions on the package.  You can make your own at home. Add 1 teaspoon of non-iodized salt and 1 teaspoon of baking soda to 2 cups of distilled or boiled and cooled water. Fill a squeeze bottle or neti pot with the nasal wash. Then put the tip into your nostril, and lean over the sink. With your mouth open, gently squirt the liquid. Repeat on the other side.     Do not smoke or breathe secondhand smoke. Smoking can make sinusitis worse. If you need help quitting, talk to your doctor about stop-smoking programs and medicines. These can increase your chances of quitting for good.     Breathe warm, moist air. You can use a steamy shower, a hot bath, or a sink filled with hot water. Avoid cold, dry air. Using a humidifier in your home may help. Follow the instructions for cleaning the machine.   When should you call for help?   Call your doctor now or seek immediate medical care if:    You have new or worse swelling, redness, or pain in your face or around one or both of your eyes.     You have double vision or a change in your vision.     You have a high fever.     You have a severe headache and a stiff neck.     You have mental changes, such as feeling confused or much less alert.   Watch closely for changes in your health, and be sure to contact your doctor if:    You have symptoms of a new sinus infection that lasts longer than 7 to 10 days. These symptoms may include the following:  You have new or worse facial  "pain.  The mucus from your nose becomes thicker (like pus) or has new blood in it.  Your stuffy nose and congestion get worse.     You do not get better as expected.   Where can you learn more?  Go to https://www.[employer].net/patiented  Enter F824 in the search box to learn more about \"Chronic Sinusitis: Care Instructions.\"  Current as of: September 27, 2023               Content Version: 14.0    3173-8159 [employer].   Care instructions adapted under license by your healthcare professional. If you have questions about a medical condition or this instruction, always ask your healthcare professional. [employer] disclaims any warranty or liability for your use of this information.       "

## 2024-08-22 ENCOUNTER — HOSPITAL ENCOUNTER (OUTPATIENT)
Dept: CT IMAGING | Facility: HOSPITAL | Age: 49
Discharge: HOME | End: 2024-08-22
Attending: PHYSICIAN ASSISTANT | Admitting: PHYSICIAN ASSISTANT
Payer: COMMERCIAL

## 2024-08-22 DIAGNOSIS — J32.9 RECURRENT SINUSITIS: ICD-10-CM

## 2024-08-22 PROCEDURE — 70486 CT MAXILLOFACIAL W/O DYE: CPT

## 2024-09-26 ENCOUNTER — LAB REQUISITION (OUTPATIENT)
Dept: LAB | Facility: CLINIC | Age: 49
End: 2024-09-26
Payer: COMMERCIAL

## 2024-09-26 DIAGNOSIS — I10 ESSENTIAL (PRIMARY) HYPERTENSION: ICD-10-CM

## 2024-09-26 PROCEDURE — 80048 BASIC METABOLIC PNL TOTAL CA: CPT | Mod: ORL | Performed by: PHYSICIAN ASSISTANT

## 2024-09-27 LAB
ANION GAP SERPL CALCULATED.3IONS-SCNC: 13 MMOL/L (ref 7–15)
BUN SERPL-MCNC: 14.1 MG/DL (ref 6–20)
CALCIUM SERPL-MCNC: 9.4 MG/DL (ref 8.8–10.4)
CHLORIDE SERPL-SCNC: 100 MMOL/L (ref 98–107)
CREAT SERPL-MCNC: 0.9 MG/DL (ref 0.51–0.95)
EGFRCR SERPLBLD CKD-EPI 2021: 78 ML/MIN/1.73M2
GLUCOSE SERPL-MCNC: 118 MG/DL (ref 70–99)
HCO3 SERPL-SCNC: 25 MMOL/L (ref 22–29)
POTASSIUM SERPL-SCNC: 3.9 MMOL/L (ref 3.4–5.3)
SODIUM SERPL-SCNC: 138 MMOL/L (ref 135–145)

## 2024-10-03 NOTE — PROGRESS NOTES
Assessment & Plan   Benign exam c/w URI. Start afrin prn, restart astelin. Could switch to budesonide washes but will continue flonase for now. Will check CT if not improving    Problem List Items Addressed This Visit    None  Visit Diagnoses       Upper respiratory tract infection, unspecified type    -  Primary    Acute recurrent sinusitis, unspecified location        Relevant Orders    CT Sinus w/o Contrast                22 minutes spent on the date of the encounter doing chart review, history and exam, documentation and further activities per the note  {     ROBERT Diaz  [facility]    Subjective     HPI-    Patient seen by me in August for recurrent sinusitis, added astelin to flonase, has allergy appt later this month, sinus CT was normal, she developed URI symptoms 9/29 and in for re-eval.  Does use albuterol sometimes when she is sick but never dx with asthma.  No childhopd asthma.      Feeling a bit better now but still having nasal congestion and now has developed cough.   Using BID saline rinses, and floanse, astelin was too much liquid and it burned her throat though it seems to help with PND.       Is taking vitamin D    Covid x 2  and flu testing were negative.           Review of Systems   ENT as above      Objective    BP (!) 178/100 (BP Location: Right arm, Patient Position: Sitting, Cuff Size: Adult Large)   Pulse 101   SpO2 97%     Physical Exam     GENERAL APPEARANCE: healthy, alert and no distress  EYES: conjunctiva clear  EARS:    Ear canals w/o erythema, TM's intact w/o erythema.    NOSE/MOUTH: Nose and mouth without ulcers, erythema or lesions  THROAT: no erythema w/ no tonsillar enlargement . no exudates  NECK: supple, nontender, no lymphadenopathy  NEURO: awake, alert

## 2024-10-04 ENCOUNTER — OFFICE VISIT (OUTPATIENT)
Dept: OTOLARYNGOLOGY | Facility: CLINIC | Age: 49
End: 2024-10-04
Payer: COMMERCIAL

## 2024-10-04 VITALS — DIASTOLIC BLOOD PRESSURE: 100 MMHG | SYSTOLIC BLOOD PRESSURE: 178 MMHG | OXYGEN SATURATION: 97 % | HEART RATE: 101 BPM

## 2024-10-04 DIAGNOSIS — J06.9 UPPER RESPIRATORY TRACT INFECTION, UNSPECIFIED TYPE: Primary | ICD-10-CM

## 2024-10-04 DIAGNOSIS — J01.91 ACUTE RECURRENT SINUSITIS, UNSPECIFIED LOCATION: ICD-10-CM

## 2024-10-04 PROCEDURE — 99213 OFFICE O/P EST LOW 20 MIN: CPT | Performed by: PHYSICIAN ASSISTANT

## 2024-10-04 NOTE — PATIENT INSTRUCTIONS
TRIAL over the counter   AFRIN (Oxymetazolin) NASAL SPRAY  (for 3 days maximum).      Trial over the counter symptomatic relief, rest, and drink plenty of fluids. Salt water gargles and nasal lavage may also be helpful.  Return to clinic or Emergency Department for breathing/swallowing problems, fever >105, altered mental status, or worsening general condition.      Upper Respiratory Infection (Cold): Care Instructions  Overview     An upper respiratory infection, or URI, is an infection of the nose, sinuses, or throat. URIs are spread by coughs, sneezes, and direct contact. The common cold is the most frequent kind of URI. The flu and sinus infections are other kinds of URIs.  Almost all URIs are caused by viruses. Antibiotics won't cure them. But you can treat most infections with home care. This may include drinking lots of fluids and taking over-the-counter pain medicine. You will probably feel better in 4 to 10 days.  Follow-up care is a key part of your treatment and safety. Be sure to make and go to all appointments, and call your doctor if you are having problems. It's also a good idea to know your test results and keep a list of the medicines you take.  How can you care for yourself at home?  To prevent dehydration, drink plenty of fluids. Choose water and other clear liquids until you feel better. If you have kidney, heart, or liver disease and have to limit fluids, talk with your doctor before you increase the amount of fluids you drink.  Ask your doctor if you can take an over-the-counter pain medicine, such as acetaminophen (Tylenol), ibuprofen (Advil, Motrin), or naproxen (Aleve). Be safe with medicines. Read and follow all instructions on the label. No one younger than 20 should take aspirin. It has been linked to Reye syndrome, a serious illness.  Be careful when taking over-the-counter cold or flu medicines and Tylenol at the same time. Many of these medicines have acetaminophen, which is Tylenol.  "Read the labels to make sure that you are not taking more than the recommended dose. Too much acetaminophen (Tylenol) can be harmful.  Get plenty of rest.  Use saline (saltwater) nasal washes to help keep your nasal passages open and wash out mucus and allergens. You can buy saline nose sprays at a grocery store or drugstore. Follow the instructions on the package. Or you can make your own at home. Add 1 teaspoon of non-iodized salt and 1 teaspoon of baking soda to 2 cups of distilled or boiled and cooled water. Fill a squeeze bottle or neti pot with the nasal wash. Then put the tip into your nostril, and lean over the sink. With your mouth open, gently squirt the liquid. Repeat on the other side.  Use a vaporizer or humidifier to add moisture to your bedroom. Follow the instructions for cleaning the machine.  Do not smoke or allow others to smoke around you. If you need help quitting, talk to your doctor about stop-smoking programs and medicines. These can increase your chances of quitting for good.  When should you call for help?   Call 911 anytime you think you may need emergency care. For example, call if:    You have severe trouble breathing.   Call your doctor now or seek immediate medical care if:    You seem to be getting much sicker.     You have new or worse trouble breathing.     You have a new or higher fever.     You have a new rash.   Watch closely for changes in your health, and be sure to contact your doctor if:    You have a new symptom, such as a sore throat, an earache, or sinus pain.     You cough more deeply or more often, especially if you notice more mucus or a change in the color of your mucus.     You do not get better as expected.   Where can you learn more?  Go to https://www.healthwise.net/patiented  Enter K520 in the search box to learn more about \"Upper Respiratory Infection (Cold): Care Instructions.\"  Current as of: October 31, 2022               Content Version: 13.7    [id number] " [employer].   Care instructions adapted under license by your healthcare professional. If you have questions about a medical condition or this instruction, always ask your healthcare professional. [employer] disclaims any warranty or liability for your use of this information.

## 2024-10-04 NOTE — LETTER
10/4/2024      Roslyn Glover  [address] [place] [address]      Dear Colleague,    Thank you for referring your patient, Roslyn Glover, to the [facility]. Please see a copy of my visit note below.    Assessment & Plan  Benign exam c/w URI. Start afrin prn, restart astelin. Could switch to budesonide washes but will continue flonase for now. Will check CT if not improving    Problem List Items Addressed This Visit    None  Visit Diagnoses       Upper respiratory tract infection, unspecified type    -  Primary    Acute recurrent sinusitis, unspecified location        Relevant Orders    CT Sinus w/o Contrast                22 minutes spent on the date of the encounter doing chart review, history and exam, documentation and further activities per the note  {     ROBERT Diaz  [facility]    Subjective     HPI-    Patient seen by me in August for recurrent sinusitis, added astelin to flonase, has allergy appt later this month, sinus CT was normal, she developed URI symptoms 9/29 and in for re-eval.  Does use albuterol sometimes when she is sick but never dx with asthma.  No childhopd asthma.      Feeling a bit better now but still having nasal congestion and now has developed cough.   Using BID saline rinses, and floanse, astelin was too much liquid and it burned her throat though it seems to help with PND.       Is taking vitamin D    Covid x 2  and flu testing were negative.           Review of Systems   ENT as above      Objective    BP (!) 178/100 (BP Location: Right arm, Patient Position: Sitting, Cuff Size: Adult Large)   Pulse 101   SpO2 97%     Physical Exam     GENERAL APPEARANCE: healthy, alert and no distress  EYES: conjunctiva clear  EARS:    Ear canals w/o erythema, TM's intact w/o erythema.    NOSE/MOUTH: Nose and mouth without ulcers, erythema or lesions  THROAT: no erythema w/ no tonsillar enlargement . no exudates  NECK:  supple, nontender, no lymphadenopathy  NEURO: awake, alert             Again, thank you for allowing me to participate in the care of your patient.        Sincerely,        ROBERT Diaz

## 2024-10-25 ENCOUNTER — OFFICE VISIT (OUTPATIENT)
Dept: ALLERGY | Facility: CLINIC | Age: 49
End: 2024-10-25
Attending: PHYSICIAN ASSISTANT
Payer: COMMERCIAL

## 2024-10-25 VITALS — OXYGEN SATURATION: 98 % | WEIGHT: 199.1 LBS | HEART RATE: 85 BPM

## 2024-10-25 DIAGNOSIS — H10.9 RHINOCONJUNCTIVITIS: Primary | ICD-10-CM

## 2024-10-25 DIAGNOSIS — J31.0 RHINOCONJUNCTIVITIS: Primary | ICD-10-CM

## 2024-10-25 DIAGNOSIS — J32.9 RECURRENT SINUSITIS: ICD-10-CM

## 2024-10-25 PROCEDURE — 99204 OFFICE O/P NEW MOD 45 MIN: CPT

## 2024-10-25 RX ORDER — CETIRIZINE HYDROCHLORIDE 10 MG/1
10 TABLET ORAL DAILY
Qty: 90 TABLET | Refills: 3 | Status: SHIPPED | OUTPATIENT
Start: 2024-10-25

## 2024-10-25 NOTE — PATIENT INSTRUCTIONS
Azelastine (Astepro ) nasal spray, 1-2 sprays in each nostril twice daily as needed.  This medication may be bitter taste, please consider brushing your teeth after using this nasal spray.     If you feel like azelastine nasal spray is not adequately controlling your nasal symptoms, start intranasal fluticasone (Flonase) 1-2 sprays in each nostril once daily.     Cetirizine (Zyrtec) 10 mg, levocetirizine (Xyzal) 5 mg, or fexofenadine (Allegra) 180 mg    Pataday (olapatadine) 1 drop/eye daily as needed.  Keep this medication in the refrigerator for comfort of your eyes.  If you wear contacts, please wait at least 10 minutes before placing the contacts into your eye.     NASAL SALINE IRRIGATION INSTRUCTIONS     You will be starting nasal saline irrigations and will need to obtain the following:       - NeilMed Sinus Rinse 8 oz Kit (or prefer device)    - Distilled or filtered water   - Normal saline salt packets     Place filtered or distilled water into the NeilMed bottle up to the fill line (DO NOT USE TAP OR WELL WATER).  Place the pre-made salt packet in the 8 oz of saline.  Shake the bottle to suspend into solution.  Lean head forward over a sink or a basin.  Rinse each side of the nose with one-half of the bottle (each squeeze is about one-half of the bottle). Rinse the nose daily.      If you use topical nasal sprays, apply following irrigation.     Video example: https://www.youtube.com/watch?[email or web address]      SINUS SALINE RINSE RECIPE    This can be completed 1-2 times daily, or as needed    Ingredients  1.  Pickling or eric salt-containing no iodide, anti-caking agents or preservatives. Do not use other salt such as table salt as these can be irritating to the nasal lining  2.  Baking soda  3.  8 ounces (1 cup) of lukewarm distilled or boiled water    In a clean container, mix 3 teaspoons of iodide-free salt with 1 teaspoon of baking soda and store in a small airtight container. Add 1 teaspoon of the  mixture to 8 ounces (1 cup) of lukewarm distilled or boiled water. Use less dry ingredients to make a weaker solution if burning or stinging is experienced.    Notes - if you are boiling tap water, cool water prior to use to prevent injury.   - do not use tap water unless it has been sterilized by boiling water prior to use.       If nasal saline irrigation is not tolerated, nasal saline sprays could be beneficial to remove allergens from the mucous membrane.    AEROALLERGEN AVOIDANCE INSTRUCTIONS  MOLD  Indoors, mold season is year round. Outdoors, most mold prefer seasons with high humidity. Mold prefers damp, dark, warm places. Here are some tips on how to avoid mold exposure.   Keep humidity inside between 35-50% with air conditioning or dehumidifier. The humidity level can be checked with a meter from a hardware store.    Clean surfaces where mold grows and dry wet areas.   Avoid steam cleaning carpets and discard moldy belongings.   Wear a mask when doing yard work and refrain from walking through uncut fields or playing in leaves.   Minimize use of potted plants and do not keep them indoors.   Consider an allergy cover for the pillow and mattress.  POLLEN  Pollens are the tiny airborne particles given off by trees, weeds, and grasses. They can be the cause of seasonal allergic rhinitis or hay fever symptoms, which include stuffy, itchy, runny nose, redness, swelling and itching of the eyes, and itching of the ears and throat. Here are some tips on how to avoid pollen exposure.  .Keep windows closed and use the air conditioner when possible.   Avoid outside exposure in the early morning as pollen counts are highest at that time.   Take a shower and wash hair each night.   Consider wearing a mask when working in the yard and/or garden.   Clean furnace filter monthly with HEPA filters. Consider a HEPA filter vacuum  which will prevent pollen from being reintroduced into the air.   DUST MITES  Dust mites  can never be entirely eliminated in the house no matter how clean your house is. Dust mites are attracted to warm, moist areas and feed on dead skin flakes. Here are tips to minimize dust mites in your home.   Encase pillows and mattress/box springs in zippered allergy covers.   Wash bedding in hot water (at least 130 F) every 7-14 days.   Avoid curtains, carpet, and upholstered furniture if possible.   Use HEPA air filters and a HEPA filter vacuum . Change filters monthly. Vacuum weekly.   Keep bedroom simple, avoiding clutter, so it can quickly be dusted.   Cover heating vents with vent filters.   Keep stuffed toys in a closed container and wash or freeze regularly.   Keep clothing in the closet with the door closed.  PETS  Pets present many problems for people with allergies. Dander from pets is very difficult to remove and also is a food source for dust mites.   If possible, find the pet a new home.   If not possible, keep the pet outdoors. Never allow the pet into the bedroom.   Wash pet weekly in warm water.   Encase mattresses, pillows, and box springs in allergen-proof covers.   Use HEPA air filters and a HEPA filter vacuum . Change filters monthly.     Common seasonal allergens:  Tree pollens ? Early to late spring  Grass pollens ? Late spring to early summer  Weed pollens ? Early summer through fall  Molds ? Present year round in many climates, but significant primarily when it is warm, humid and wet  Common perennial (year?round) allergens:  Dust mites ? a very common indoor allergen that causes significant allergy in most of the [place]; highest allergen levels are in the bed  Pet dander ? cat and dog are the most common, but anything with fur or hair can cause allergies. Immunotherapy has only been shown to work well for cat and dog.

## 2024-10-25 NOTE — PROGRESS NOTES
Roslyn Glover was seen in the Allergy Clinic at [facility].    Roslyn Glover is a 49 year old female  being seen today for ongoing evaluation of recurrent sinusitis.  Referral from,     Alphonso Samuel PA [facility]     Add astelin, nasal saline, CT   Recurrent sinusitis    -  Primary      Relevant Medications     albuterol (PROAIR HFA/PROVENTIL HFA/VENTOLIN HFA) 108 (90 Base) MCG/ACT inhaler     fexofenadine (ALLEGRA ALLERGY) 180 MG tablet     fluticasone (FLONASE) 50 MCG/ACT nasal spray     azelastine (ASTELIN) 0.1 % nasal spray     Other Relevant Orders     CT Sinus w/o Contrast     Adult Allergy/Asthma  Referral     PND (post-nasal drip)         Relevant Medications     albuterol (PROAIR HFA/PROVENTIL HFA/VENTOLIN HFA) 108 (90 Base) MCG/ACT inhaler     fexofenadine (ALLEGRA ALLERGY) 180 MG tablet     fluticasone (FLONASE) 50 MCG/ACT nasal spray     azelastine (ASTELIN) 0.1 % nasal spray     Seasonal allergic rhinitis, unspecified trigger         Relevant Medications     albuterol (PROAIR HFA/PROVENTIL HFA/VENTOLIN HFA) 108 (90 Base) MCG/ACT inhaler     fexofenadine (ALLEGRA ALLERGY) 180 MG tablet     fluticasone (FLONASE) 50 MCG/ACT nasal spray     azelastine (ASTELIN) 0.1 % nasal spray     Deviated nasal septum           History of Present Illness:     Rhinoconjunctivitis  Patient reports over the last year, she has had recurrent sinus infections, she reports she is currently being treated for another sinus infection.    Perennial/seasonally-exacerbated (Spring, Fall)  chronic nasal symptoms (itch, clear rhinorrhea, stuffiness, and sneezing), postnasal drip and ocular symptoms (itching, redness, and watering).  Patient reports she does have significant symptoms around pets, does not own any pets due to this reason.  Nasal sprays fluticasone and azelastine have been used, and they were partially effective. Oral antihistamines  cetirizine and fexofenadine have been used. They have been partially effective, but continues to have sinus infections despite these medications.  Patient reports she is also doing nasal saline irrigation, ear twice daily nasal saline irrigation reports sinus symptoms/sinus infection.  Patient reports her postnasal drainage and most bothersome symptom associated with her environmental allergies.  Patient reports she does not have a plan to go back to see ENT provider regarding her concerns.    [facility] New Allergy And Asthma    Question 10/20/2024 10:45 AM CDT - Filed by Patient   Reason for visit: Nasal or sinus symptoms   Nasal/ Sinus/ Eye Symptoms    When did your symptoms begin? Worsened about one year ago, but it has been an off and on lifelong issue.   What symptoms do you have? Itchy eyes    Watery eyes    Stuffy nose    Sinus pressure    Drainage down the throat    Ear pressure   Any prior sinus surgeries? None   History of nasal polyps? None   History of sinus infections? Yes   How many in the past year? Approximately 8-9.   Have you tried pills/oral medications? Yes   Which ones? Zyrtec, Allegra, Claritin   Have you tried nasal sprays? Yes   Which ones? Flonase, Afrin, Azelastine   Have you used eye drops? Yes   Which ones? Systane   When do symptoms occur? Year-round    Indoors    Outdoors   What makes symptoms worse? Animals    Dust    Temperature changes    Scents/perfumes   Environmental and Social History    Place of Residence: House   Do you have Central Air Conditioning? Yes   Type of Heating System: Forced air   Wood burning stove or fireplace: No   Occupation: Psychotherapist   Pets: No   Do you smoke cigarettes: No   Do you use an e-cigarette or vape? No   Does anyone living in your home smoke or vape? No   Family History    Do your parents, siblings, or children have asthma? No   Do your parents, siblings, or children have environmental allergies? Yes   Who? Mother, Father, Daughter   Do your  parents, siblings, or children have food allergies? No   Do your parents, siblings, or children have eczema? Yes   Who? Daughter when she was much younger. Not an issue now.       No past medical history on file.    No past surgical history on file.      Current Outpatient Medications:     albuterol (PROAIR HFA/PROVENTIL HFA/VENTOLIN HFA) 108 (90 Base) MCG/ACT inhaler, INHALE 1 PUFF INTO THE LUNGS EVERY 4 HOURS AS NEEDED FOR 30 DAYS, Disp: , Rfl:     azelastine (ASTELIN) 0.1 % nasal spray, Spray 1 spray into both nostrils at bedtime, Disp: 30 mL, Rfl: 11    Bacillus Coagulans-Inulin (PROBIOTIC) 1-250 BILLION-MG CAPS, 1 cap(s) orally once a day, Disp: , Rfl:     fexofenadine (ALLEGRA ALLERGY) 180 MG tablet, 1 tablet Swallow whole with water; do not take with fruit juices. Orally Once a day, Disp: , Rfl:     fluticasone (FLONASE) 50 MCG/ACT nasal spray, 1 spray in each nostril Nasally Once a day, Disp: , Rfl:     losartan (COZAAR) 50 MG tablet, , Disp: , Rfl:     multivitamin w/minerals (MULTI-VITAMIN) tablet, , Disp: , Rfl:     vitamin D3 (CHOLECALCIFEROL) 50 mcg (2000 units) tablet, , Disp: , Rfl:   Allergies   Allergen Reactions    Azithromycin Diarrhea         No family history on file.        EXAM:   There were no vitals taken for this visit.    Physical Exam  Constitutional:       General: She is not in acute distress.     Appearance: Normal appearance. She is not ill-appearing.   HENT:      Nose: Rhinorrhea present. No nasal deformity, septal deviation or congestion.      Right Turbinates: Not enlarged, swollen or pale.      Left Turbinates: Not enlarged, swollen or pale.      Mouth/Throat:      Mouth: Mucous membranes are moist.      Pharynx: Oropharynx is clear. Uvula midline. No posterior oropharyngeal erythema.      Tonsils: 0 on the right. 0 on the left.   Eyes:      General: No allergic shiner.        Right eye: No discharge.         Left eye: No discharge.      Conjunctiva/sclera: Conjunctivae normal.    Cardiovascular:      Rate and Rhythm: Normal rate and regular rhythm.      Heart sounds: Normal heart sounds, S1 normal and S2 normal.   Pulmonary:      Effort: Pulmonary effort is normal. No prolonged expiration.      Breath sounds: Normal breath sounds. No decreased air movement. No wheezing.   Neurological:      Mental Status: She is alert.   Psychiatric:         Mood and Affect: Mood normal.         Behavior: Behavior normal.         Judgment: Judgment normal.         WORKUP: IgE laboratory evaluation for environmental allergies, currently is sick and using albuterol inhaler for increased coughing episodes.  Patient has used albuterol inhaler prior to appointment today.     ASSESSMENT/PLAN:  Roslyn Glover is a 49 year old female with rhinoconjunctivitis, and recurrent sinus infections.    Rhinoconjunctivitis  IgE evaluation, [facility].    Azelastine (Astepro ) nasal spray, 1-2 sprays in each nostril twice daily as needed.  This medication may be better taste, please consider brushing your teeth after using this nasal spray.     If you feel like azelastine nasal spray is not adequately controlling your nasal symptoms, start intranasal fluticasone (Flonase) 1-2 sprays in each nostril once daily.  We did discuss potential side effects with long-term use of nasal steroid sprays of nasal septum breakdown.     Cetirizine (Zyrtec) 10 mg, levocetirizine (Xyzal) 5 mg, or fexofenadine (Allegra) 180 mg, patient may potentially rotate to release medication when she experiences decreased effectiveness.    Pataday (olapatadine) 1 drop/eye daily as needed.  Patient reports she has seen an eye doctor potentially has dry eye, has not been diagnosed.  We did review if antihistamine eyedrops make her condition worse, I would recommend further evaluation with her eye doctor, for dry eyes/allergy conjunctivitis.    Patient was given instructions for nasal saline irrigation, 1-2  times daily as needed.    Patient was given instructions for environmental modifications techniques.    We did briefly discuss that patients with uncontrolled environmental allergies or fail the above listed treatment plan for annual reason may be a candidate for allergy immunotherapy.    Patient was instructed to return to ENT provider if she has continued sinus infections.    Follow-up as needed    Thank you for allowing me to participate in the care of Roslyn Glover.    I spent 45 minutes on the date of the encounter doing chart review, history and exam, documentation and further coordination as noted above exclusive of separately reported interpretations.       Please note that this note consists of symbols derived from keyboarding, dictation and/or voice recognition software. As a result, there may be errors in the script that have gone undetected. Please consider this when interpreting information found in this chart.     Julia Chin PA-C  [facility]

## 2024-10-25 NOTE — LETTER
10/25/2024      Roslyn Glover  [address] [address]      Dear Colleague,    Thank you for referring your patient, Roslyn Glover, to the [facility]. Please see a copy of my visit note below.    Roslyn Glover was seen in the Allergy Clinic at [facility].    Roslyn Glover is a 49 year old female  being seen today for ongoing evaluation of recurrent sinusitis.  Referral from,     Alphonso Samuel PA [facility]     Add astelin, nasal saline, CT   Recurrent sinusitis    -  Primary      Relevant Medications     albuterol (PROAIR HFA/PROVENTIL HFA/VENTOLIN HFA) 108 (90 Base) MCG/ACT inhaler     fexofenadine (ALLEGRA ALLERGY) 180 MG tablet     fluticasone (FLONASE) 50 MCG/ACT nasal spray     azelastine (ASTELIN) 0.1 % nasal spray     Other Relevant Orders     CT Sinus w/o Contrast     Adult Allergy/Asthma  Referral     PND (post-nasal drip)         Relevant Medications     albuterol (PROAIR HFA/PROVENTIL HFA/VENTOLIN HFA) 108 (90 Base) MCG/ACT inhaler     fexofenadine (ALLEGRA ALLERGY) 180 MG tablet     fluticasone (FLONASE) 50 MCG/ACT nasal spray     azelastine (ASTELIN) 0.1 % nasal spray     Seasonal allergic rhinitis, unspecified trigger         Relevant Medications     albuterol (PROAIR HFA/PROVENTIL HFA/VENTOLIN HFA) 108 (90 Base) MCG/ACT inhaler     fexofenadine (ALLEGRA ALLERGY) 180 MG tablet     fluticasone (FLONASE) 50 MCG/ACT nasal spray     azelastine (ASTELIN) 0.1 % nasal spray     Deviated nasal septum           History of Present Illness:     Rhinoconjunctivitis  Patient reports over the last year, she has had recurrent sinus infections, she reports she is currently being treated for another sinus infection.    Perennial/seasonally-exacerbated (Spring, Fall)  chronic nasal symptoms (itch, clear rhinorrhea, stuffiness, and sneezing), postnasal drip and ocular symptoms (itching,  redness, and watering).  Patient reports she does have significant symptoms around pets, does not own any pets due to this reason.  Nasal sprays fluticasone and azelastine have been used, and they were partially effective. Oral antihistamines cetirizine and fexofenadine have been used. They have been partially effective, but continues to have sinus infections despite these medications.  Patient reports she is also doing nasal saline irrigation, ear twice daily nasal saline irrigation reports sinus symptoms/sinus infection.  Patient reports her postnasal drainage and most bothersome symptom associated with her environmental allergies.  Patient reports she does not have a plan to go back to see ENT provider regarding her concerns.    [facility] New Allergy And Asthma    Question 10/20/2024 10:45 AM CDT - Filed by Patient   Reason for visit: Nasal or sinus symptoms   Nasal/ Sinus/ Eye Symptoms    When did your symptoms begin? Worsened about one year ago, but it has been an off and on lifelong issue.   What symptoms do you have? Itchy eyes    Watery eyes    Stuffy nose    Sinus pressure    Drainage down the throat    Ear pressure   Any prior sinus surgeries? None   History of nasal polyps? None   History of sinus infections? Yes   How many in the past year? Approximately 8-9.   Have you tried pills/oral medications? Yes   Which ones? Zyrtec, Allegra, Claritin   Have you tried nasal sprays? Yes   Which ones? Flonase, Afrin, Azelastine   Have you used eye drops? Yes   Which ones? Systane   When do symptoms occur? Year-round    Indoors    Outdoors   What makes symptoms worse? Animals    Dust    Temperature changes    Scents/perfumes   Environmental and Social History    Place of Residence: House   Do you have Central Air Conditioning? Yes   Type of Heating System: Forced air   Wood burning stove or fireplace: No   Occupation: Psychotherapist   Pets: No   Do you smoke cigarettes: No   Do you use an e-cigarette or vape? No   Does  anyone living in your home smoke or vape? No   Family History    Do your parents, siblings, or children have asthma? No   Do your parents, siblings, or children have environmental allergies? Yes   Who? Mother, Father, Daughter   Do your parents, siblings, or children have food allergies? No   Do your parents, siblings, or children have eczema? Yes   Who? Daughter when she was much younger. Not an issue now.       No past medical history on file.    No past surgical history on file.      Current Outpatient Medications:      albuterol (PROAIR HFA/PROVENTIL HFA/VENTOLIN HFA) 108 (90 Base) MCG/ACT inhaler, INHALE 1 PUFF INTO THE LUNGS EVERY 4 HOURS AS NEEDED FOR 30 DAYS, Disp: , Rfl:      azelastine (ASTELIN) 0.1 % nasal spray, Spray 1 spray into both nostrils at bedtime, Disp: 30 mL, Rfl: 11     Bacillus Coagulans-Inulin (PROBIOTIC) 1-250 BILLION-MG CAPS, 1 cap(s) orally once a day, Disp: , Rfl:      fexofenadine (ALLEGRA ALLERGY) 180 MG tablet, 1 tablet Swallow whole with water; do not take with fruit juices. Orally Once a day, Disp: , Rfl:      fluticasone (FLONASE) 50 MCG/ACT nasal spray, 1 spray in each nostril Nasally Once a day, Disp: , Rfl:      losartan (COZAAR) 50 MG tablet, , Disp: , Rfl:      multivitamin w/minerals (MULTI-VITAMIN) tablet, , Disp: , Rfl:      vitamin D3 (CHOLECALCIFEROL) 50 mcg (2000 units) tablet, , Disp: , Rfl:   Allergies   Allergen Reactions     Azithromycin Diarrhea         No family history on file.        EXAM:   There were no vitals taken for this visit.    Physical Exam  Constitutional:       General: She is not in acute distress.     Appearance: Normal appearance. She is not ill-appearing.   HENT:      Nose: Rhinorrhea present. No nasal deformity, septal deviation or congestion.      Right Turbinates: Not enlarged, swollen or pale.      Left Turbinates: Not enlarged, swollen or pale.      Mouth/Throat:      Mouth: Mucous membranes are moist.      Pharynx: Oropharynx is clear. Uvula  midline. No posterior oropharyngeal erythema.      Tonsils: 0 on the right. 0 on the left.   Eyes:      General: No allergic shiner.        Right eye: No discharge.         Left eye: No discharge.      Conjunctiva/sclera: Conjunctivae normal.   Cardiovascular:      Rate and Rhythm: Normal rate and regular rhythm.      Heart sounds: Normal heart sounds, S1 normal and S2 normal.   Pulmonary:      Effort: Pulmonary effort is normal. No prolonged expiration.      Breath sounds: Normal breath sounds. No decreased air movement. No wheezing.   Neurological:      Mental Status: She is alert.   Psychiatric:         Mood and Affect: Mood normal.         Behavior: Behavior normal.         Judgment: Judgment normal.         WORKUP: IgE laboratory evaluation for environmental allergies, currently is sick and using albuterol inhaler for increased coughing episodes.  Patient has used albuterol inhaler prior to appointment today.     ASSESSMENT/PLAN:  Roslyn Glover is a 49 year old female with rhinoconjunctivitis, and recurrent sinus infections.    Rhinoconjunctivitis  IgE evaluation, [facility].    Azelastine (Astepro ) nasal spray, 1-2 sprays in each nostril twice daily as needed.  This medication may be better taste, please consider brushing your teeth after using this nasal spray.     If you feel like azelastine nasal spray is not adequately controlling your nasal symptoms, start intranasal fluticasone (Flonase) 1-2 sprays in each nostril once daily.  We did discuss potential side effects with long-term use of nasal steroid sprays of nasal septum breakdown.     Cetirizine (Zyrtec) 10 mg, levocetirizine (Xyzal) 5 mg, or fexofenadine (Allegra) 180 mg, patient may potentially rotate to release medication when she experiences decreased effectiveness.    Pataday (olapatadine) 1 drop/eye daily as needed.  Patient reports she has seen an eye doctor potentially has dry eye, has not been  diagnosed.  We did review if antihistamine eyedrops make her condition worse, I would recommend further evaluation with her eye doctor, for dry eyes/allergy conjunctivitis.    Patient was given instructions for nasal saline irrigation, 1-2 times daily as needed.    Patient was given instructions for environmental modifications techniques.    We did briefly discuss that patients with uncontrolled environmental allergies or fail the above listed treatment plan for annual reason may be a candidate for allergy immunotherapy.    Patient was instructed to return to ENT provider if she has continued sinus infections.    Follow-up as needed    Thank you for allowing me to participate in the care of Roslyn Glover.    I spent 45 minutes on the date of the encounter doing chart review, history and exam, documentation and further coordination as noted above exclusive of separately reported interpretations.       Please note that this note consists of symbols derived from keyboarding, dictation and/or voice recognition software. As a result, there may be errors in the script that have gone undetected. Please consider this when interpreting information found in this chart.     Julia Chin PA-C  [facility]      Again, thank you for allowing me to participate in the care of your patient.        Sincerely,        Julia Chin PA-C

## 2024-10-26 ENCOUNTER — LAB (OUTPATIENT)
Dept: LAB | Facility: CLINIC | Age: 49
End: 2024-10-26
Payer: COMMERCIAL

## 2024-10-26 DIAGNOSIS — J31.0 RHINOCONJUNCTIVITIS: ICD-10-CM

## 2024-10-26 DIAGNOSIS — H10.9 RHINOCONJUNCTIVITIS: ICD-10-CM

## 2024-10-26 PROCEDURE — 36415 COLL VENOUS BLD VENIPUNCTURE: CPT

## 2024-10-26 PROCEDURE — 82785 ASSAY OF IGE: CPT

## 2024-10-26 PROCEDURE — 86003 ALLG SPEC IGE CRUDE XTRC EA: CPT

## 2024-10-28 LAB
A ALTERNATA IGE QN: 0.34 KU(A)/L
A FUMIGATUS IGE QN: <0.1 KU(A)/L
C HERBARUM IGE QN: <0.1 KU(A)/L
CALIF WALNUT POLN IGE QN: 0.14 KU(A)/L
CAT DANDER IGG QN: 8.9 KU(A)/L
CEDAR IGE QN: <0.1 KU(A)/L
COMMON RAGWEED IGE QN: 9.73 KU(A)/L
COTTONWOOD IGE QN: <0.1 KU(A)/L
D FARINAE IGE QN: <0.1 KU(A)/L
D PTERONYSS IGE QN: <0.1 KU(A)/L
DOG DANDER+EPITH IGE QN: 4.28 KU(A)/L
E PURPURASCENS IGE QN: <0.1 KU(A)/L
EAST WHITE PINE IGE QN: <0.1 KU(A)/L
ENGL PLANTAIN IGE QN: 0.15 KU(A)/L
FIREBUSH IGE QN: 0.12 KU(A)/L
GIANT RAGWEED IGE QN: 3.03 KU(A)/L
GOOSEFOOT IGE QN: 0.11 KU(A)/L
IGE SERPL-ACNC: 169 KU/L (ref 0–114)
JOHNSON GRASS IGE QN: <0.1 KU(A)/L
MAPLE IGE QN: 0.4 KU(A)/L
MUGWORT IGE QN: 1.25 KU(A)/L
NETTLE IGE QN: <0.1 KU(A)/L
P NOTATUM IGE QN: <0.1 KU(A)/L
RED MULBERRY IGE QN: <0.1 KU(A)/L
SALTWORT IGE QN: 0.13 KU(A)/L
SHEEP SORREL IGE QN: 0.13 KU(A)/L
SILVER BIRCH IGE QN: 16.2 KU(A)/L
TIMOTHY IGE QN: <0.1 KU(A)/L
WHITE ASH IGE QN: 0.31 KU(A)/L
WHITE ELM IGE QN: 0.15 KU(A)/L
WHITE MULBERRY IGE QN: <0.1 KU(A)/L
WHITE OAK IGE QN: 4.95 KU(A)/L
WORMWOOD IGE QN: 1.4 KU(A)/L

## 2024-11-12 ENCOUNTER — TELEPHONE (OUTPATIENT)
Dept: OTOLARYNGOLOGY | Facility: CLINIC | Age: 49
End: 2024-11-12
Payer: COMMERCIAL

## 2024-11-12 ENCOUNTER — HOSPITAL ENCOUNTER (OUTPATIENT)
Dept: CT IMAGING | Facility: HOSPITAL | Age: 49
Discharge: HOME | End: 2024-11-12
Attending: PHYSICIAN ASSISTANT | Admitting: PHYSICIAN ASSISTANT
Payer: COMMERCIAL

## 2024-11-12 DIAGNOSIS — J32.9 RECURRENT SINUSITIS: ICD-10-CM

## 2024-11-12 DIAGNOSIS — J01.90 ACUTE SINUSITIS TREATED WITH ANTIBIOTICS IN THE PAST 60 DAYS: Primary | ICD-10-CM

## 2024-11-12 PROCEDURE — 70486 CT MAXILLOFACIAL W/O DYE: CPT

## 2024-11-12 RX ORDER — DOXYCYCLINE 100 MG/1
100 TABLET ORAL EVERY 12 HOURS
Qty: 28 TABLET | Refills: 0 | Status: SHIPPED | OUTPATIENT
Start: 2024-11-12 | End: 2024-11-26

## 2024-11-12 RX ORDER — METHYLPREDNISOLONE 4 MG/1
TABLET ORAL
Qty: 21 TABLET | Refills: 0 | Status: SHIPPED | OUTPATIENT
Start: 2024-11-12

## 2024-11-12 RX ORDER — PREDNISONE 10 MG/1
10 TABLET ORAL 2 TIMES DAILY
Qty: 28 TABLET | Refills: 0 | Status: SHIPPED | OUTPATIENT
Start: 2024-11-12 | End: 2024-11-26

## 2024-12-07 ENCOUNTER — HEALTH MAINTENANCE LETTER (OUTPATIENT)
Age: 49
End: 2024-12-07

## 2025-02-19 ENCOUNTER — OFFICE VISIT (OUTPATIENT)
Dept: OTOLARYNGOLOGY | Facility: CLINIC | Age: 50
End: 2025-02-19
Payer: COMMERCIAL

## 2025-02-19 DIAGNOSIS — J34.2 DEVIATED NASAL SEPTUM: ICD-10-CM

## 2025-02-19 DIAGNOSIS — J01.01 ACUTE RECURRENT MAXILLARY SINUSITIS: Primary | ICD-10-CM

## 2025-02-19 DIAGNOSIS — J34.3 NASAL TURBINATE HYPERTROPHY: ICD-10-CM

## 2025-02-19 PROCEDURE — 99213 OFFICE O/P EST LOW 20 MIN: CPT | Performed by: OTOLARYNGOLOGY

## 2025-02-19 RX ORDER — LOSARTAN POTASSIUM 100 MG/1
TABLET ORAL
COMMUNITY
Start: 2024-09-29

## 2025-02-19 NOTE — PATIENT INSTRUCTIONS
Possible risks from nasal septoplasty:     Continued symptoms, such as nasal obstruction.   Excessive bleeding.   A change in the shape of your nose.   A hole in the septum.   Decrease or loss of smell (temporary or permanent)  Accumulation of blood in the septum that has to be drained.   Temporary numbness in the upper gum, teeth or nose.    Sinus surgery has potential risks and complications:    Failure to resolve symptoms  Pain  Bleeding and/or scar formation  Damage to the eyes or base of the skull (rare)  Loss of sense of smell or taste  The need for additional procedures and/or medical consultations  Empty nose syndrome (excessive dryness and crusting)       Return visit 4-6 months.    Will discuss:    Balloon assisted septoplasty   Inferior turbinate reduction  Balloon dilation of frontal and maxillary sinuses

## 2025-02-19 NOTE — PROGRESS NOTES
ENT FOLLOW UP VISIT NOTE    Patient presents with:  Follow Up: Follow up sinuses, Per PT she has seen [facility], He Rx'ed  budesonide sinus rinses that she is using one time a day and methylPREDNISolone that she is done with. She dose not use the azelastine nasal spray. This got rid of her sinus infection, since then she has been doing well. SNOT 19             HISTORY OF PRESENT ILLNESS    Tim was seen in follow up for sinus concerns.    Sino-Nasal Outcome Test (SNOT - 22)    1. Need to Blow Nose: (Patient-Rptd) (P) Mild or slight  2. Nasal Blockage: (Patient-Rptd) (P) Mild or slight  3. Sneezing: (Patient-Rptd) (P) None  4. Runny Nose: (Patient-Rptd) (P) Mild or slight  5. Cough: (Patient-Rptd) (P) Mild or slight  6. Post-nasal discharge: (Patient-Rptd) (P) Moderate  7. Thick nasal discharge: (Patient-Rptd) (P) Mild or slight  8. Ear fullness: (Patient-Rptd) (P) Mild or slight  9. Dizziness: (Patient-Rptd) (P) None  10. Ear Pain: (Patient-Rptd) (P) Very mild  11. Facial pain/pressure: (Patient-Rptd) (P) Mild or slight  12. Decreased Sense of Smell/Taste: (Patient-Rptd) (P) None  13. Difficulty falling asleep: (Patient-Rptd) (P) None  14. Wake up at night: (Patient-Rptd) (P) None  15. Lack of a good night's sleep: (Patient-Rptd) (P) None  16. Wake up tired: (Patient-Rptd) (P) Very mild  17. Fatigue: (Patient-Rptd) (P) None  18. Reduced Productivity: (Patient-Rptd) (P) None  19. Reduced Concentration: (Patient-Rptd) (P) None  20. Frustrated/restless/irritable: (Patient-Rptd) (P) None  21. Sad: (Patient-Rptd) (P) None  22. Embarrassed: (Patient-Rptd) (P) None    Total Score: (Patient-Rptd) (P) 19    COPYRIGHT 1996. [facility] [place], [place]     ENT previous note:    Benign exam c/w URI. Start afrin prn, restart astelin. Could switch to budesonide washes but will continue flonase for now. Will check CT if not improving     Problem List Items Addressed This Visit    None  Visit Diagnoses         Upper  respiratory tract infection, unspecified type    -  Primary     Acute recurrent sinusitis, unspecified location         Relevant Orders     CT Sinus w/o Contrast           ALLERGIES    Azithromycin    CURRENT MEDICATIONS      Current Outpatient Medications:     losartan (COZAAR) 100 MG tablet, , Disp: , Rfl:     albuterol (PROAIR HFA/PROVENTIL HFA/VENTOLIN HFA) 108 (90 Base) MCG/ACT inhaler, INHALE 1 PUFF INTO THE LUNGS EVERY 4 HOURS AS NEEDED FOR 30 DAYS, Disp: , Rfl:     azelastine (ASTELIN) 0.1 % nasal spray, Spray 1 spray into both nostrils at bedtime, Disp: 30 mL, Rfl: 11    Bacillus Coagulans-Inulin (PROBIOTIC) 1-250 BILLION-MG CAPS, 1 cap(s) orally once a day, Disp: , Rfl:     budesonide (PULMICORT) 0.5 MG/2ML neb solution, Place 0.5 mg/2 mL budesonide vial in 8 oz normal saline sinus rinse bottle.  Irrigate each nostril with one half of the bottle twice daily., Disp: 360 mL, Rfl: 3    cetirizine (ZYRTEC) 10 MG tablet, Take 1 tablet (10 mg) by mouth daily., Disp: 90 tablet, Rfl: 3    fexofenadine (ALLEGRA ALLERGY) 180 MG tablet, 1 tablet Swallow whole with water; do not take with fruit juices. Orally Once a day, Disp: , Rfl:     fluticasone (FLONASE) 50 MCG/ACT nasal spray, 1 spray in each nostril Nasally Once a day, Disp: , Rfl:     losartan (COZAAR) 50 MG tablet, , Disp: , Rfl:     methylPREDNISolone (MEDROL) 4 MG tablet therapy pack, Start after completing two weeks of prednisone.  Follow Package Directions, Disp: 21 tablet, Rfl: 0    multivitamin w/minerals (MULTI-VITAMIN) tablet, , Disp: , Rfl:     vitamin D3 (CHOLECALCIFEROL) 50 mcg (2000 units) tablet, , Disp: , Rfl:      PAST MEDICAL HISTORY    PAST MEDICAL HISTORY: No past medical history on file.    PAST SURGICAL HISTORY    PAST SURGICAL HISTORY: No past surgical history on file.    FAMILY  HISTORY    FAMILY HISTORY: No family history on file.    SOCIAL HISTORY    SOCIAL HISTORY:   Social History     Tobacco Use    Smoking status: Never    Smokeless  tobacco: Never   Substance Use Topics    Alcohol use: Not Currently        PHYSICAL EXAM    HEAD: Normal appearance and symmetry:  No cutaneous lesions.      NECK:  supple     EARS:  Auricles normal without lesions    EYES:  EOMI    CN VII/XII:  intact     NOSE:  patent    Septum: deviated LEFT   ITH:  3-4+ pale, boggy           ORAL CAVITY/OROPHARYNX:     Lips:  Normal.  Tongue: normal, midline  Mucosa:   no lesions     NECK:  Trachea:  midline.        NEURO:   Alert and Oriented        RESPIRATORY:   Symmetry and Respiratory effort     PSYCH:  Normal mood and affect     SKIN:   warm and dry         IMPRESSION:    Encounter Diagnosis   Name Primary?    Acute recurrent maxillary sinusitis Yes       RECOMMENDATIONS:    Patient would benefit from BA septoplasty; Turbinoplasty along with Balloon sinus dilation of frontal and maxillary sinuses.

## 2025-08-25 ENCOUNTER — PATIENT OUTREACH (OUTPATIENT)
Dept: CARE COORDINATION | Facility: CLINIC | Age: 50
End: 2025-08-25
Payer: COMMERCIAL

## 2025-08-29 ENCOUNTER — HOSPITAL ENCOUNTER (OUTPATIENT)
Dept: CT IMAGING | Facility: HOSPITAL | Age: 50
Discharge: HOME | End: 2025-08-29
Attending: OTOLARYNGOLOGY | Admitting: OTOLARYNGOLOGY
Payer: COMMERCIAL

## 2025-08-29 DIAGNOSIS — R09.81 CONGESTION OF PARANASAL SINUS: ICD-10-CM

## 2025-08-29 PROCEDURE — 70486 CT MAXILLOFACIAL W/O DYE: CPT
